# Patient Record
Sex: MALE | Race: OTHER | NOT HISPANIC OR LATINO | Employment: FULL TIME | URBAN - METROPOLITAN AREA
[De-identification: names, ages, dates, MRNs, and addresses within clinical notes are randomized per-mention and may not be internally consistent; named-entity substitution may affect disease eponyms.]

---
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
## 2021-05-25 ENCOUNTER — APPOINTMENT (OUTPATIENT)
Dept: RADIOLOGY | Facility: IMAGING CENTER | Age: 21
End: 2021-05-25
Attending: FAMILY MEDICINE

## 2021-05-25 ENCOUNTER — OCCUPATIONAL MEDICINE (OUTPATIENT)
Dept: URGENT CARE | Facility: CLINIC | Age: 21
End: 2021-05-25
Payer: COMMERCIAL

## 2021-05-25 VITALS
TEMPERATURE: 98.1 F | HEIGHT: 73 IN | BODY MASS INDEX: 34.46 KG/M2 | DIASTOLIC BLOOD PRESSURE: 86 MMHG | WEIGHT: 260 LBS | SYSTOLIC BLOOD PRESSURE: 114 MMHG | RESPIRATION RATE: 18 BRPM | HEART RATE: 101 BPM | OXYGEN SATURATION: 95 %

## 2021-05-25 DIAGNOSIS — S93.402A SPRAIN OF LEFT ANKLE, UNSPECIFIED LIGAMENT, INITIAL ENCOUNTER: ICD-10-CM

## 2021-05-25 PROCEDURE — 99203 OFFICE O/P NEW LOW 30 MIN: CPT | Performed by: FAMILY MEDICINE

## 2021-05-25 PROCEDURE — 73610 X-RAY EXAM OF ANKLE: CPT | Mod: TC,FY,LT | Performed by: FAMILY MEDICINE

## 2021-05-25 ASSESSMENT — ENCOUNTER SYMPTOMS: FALLS: 1

## 2021-05-25 NOTE — PROGRESS NOTES
"Subjective:      Philippe Baum is a 21 y.o. 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 Pain to outer ankle and swelling since.      HPI    Review of Systems   Musculoskeletal: Positive for falls and joint pain.   All other systems reviewed and are negative.         Objective:     /86   Pulse (!) 101   Temp 36.7 °C (98.1 °F) (Temporal)   Resp 18   Ht 1.854 m (6' 1\")   Wt 118 kg (260 lb)   SpO2 95%   BMI 34.30 kg/m²      Physical Exam  Vitals and nursing note reviewed.   Constitutional:       General: He is not in acute distress.     Appearance: Normal appearance. He is well-developed.   HENT:      Head: Normocephalic.   Cardiovascular:      Heart sounds: Normal heart sounds. No murmur heard.     Pulmonary:      Effort: Pulmonary effort is normal. No respiratory distress.   Musculoskeletal:        Feet:    Feet:      Comments: Lt ankle: + edema and TTP. Good SROM. No wounds or discoloration   Neurological:      Mental Status: He is alert.      Motor: No abnormal muscle tone.   Psychiatric:         Mood and Affect: Mood normal.         Behavior: Behavior normal.          Assessment/Plan:          1. Sprain of left ankle  DX-ANKLE 3+ VIEWS LEFT     - walker boot and crutches w/ WBAT  - sedentary duty   - RICE/Motrin  - 5 day recheck    "

## 2021-05-25 NOTE — LETTER
[facility]  [address] - [place] [address]  Phone:  [phone] - Fax:  [phone]   [facility] Progress Report and Disability Certification  Date of Service: 5/25/2021   No Show:  No  Date / Time of Next Visit: 5/30/2021   Claim Information   Patient Name: Philippe Baum  Claim Number:     Employer:   [employer] Date of Injury: 5/25/2021     Insurer / TPA: [employer] *** ID / SSN:    Occupation:   Diagnosis: The encounter diagnosis was Sprain of left ankle.    Medical Information   Related to Industrial Injury? 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 Pain to outer ankle and swelling since.    Objective Findings:     Pre-Existing Condition(s):     Assessment:   Initial Visit    Status: Additional Care Required  Permanent Disability:No    Plan:      Diagnostics: X-ray    Comments:       Disability Information   Status: Released to Restricted Duty    From:  5/25/2021  Through: 5/30/2021 Restrictions are: Temporary   Physical Restrictions   Sitting:    Standing:    Stooping:    Bending:      Squatting:    Walking:    Climbing:    Pushing:      Pulling:    Other:    Reaching Above Shoulder (L):   Reaching Above Shoulder (R):       Reaching Below Shoulder (L):    Reaching Below Shoulder (R):      Not to exceed Weight Limits   Carrying(hrs):   Weight Limit(lb):   Lifting(hrs):   Weight  Limit(lb):     Comments: Sedentary duty     Repetitive Actions   Hands: i.e. Fine Manipulations from Grasping:     Feet: i.e. Operating Foot Controls:     Driving / Operate Machinery:     Provider Name:   Richard Neely M.D. Physician Signature:  Physician Name:     Clinic Name / Location: [facility] [place]  [address]  [place] [address] Clinic Phone Number: Dept: [phone]      Appointment Time: 4:00 Pm Visit Start Time: 4:34 PM   Check-In Time:  4:14 Pm Visit Discharge Time:  5:30 PM   Original-Treating Physician or Chiropractor    Page 2-Insurer/TPA    Page 3-Employer    Page 4-Employee

## 2021-05-25 NOTE — LETTER
"EMPLOYEE’S CLAIM FOR COMPENSATION/ REPORT OF INITIAL TREATMENT  FORM C-4    EMPLOYEE’S CLAIM - PROVIDE ALL INFORMATION REQUESTED   First Name  Philippe Last Name  Bautista Birthdate                    2000                Sex  male Claim Number   Home Address  [address] Age  21 y.o. Height  1.854 m (6' 1\") Weight  118 kg (260 lb) [facility]     [facility] Zip  [address] Telephone  [phone] (home)    Mailing Address  [address] [facility] Zip  [address] Primary Language Spoken  English    Insurer  [employer] Third Party   [employer]   Employee's Occupation (Job Title) When Injury or Occupational Disease Occurred      Employer's Name    [employer] Telephone   [phone]   Employer Address  [address]  Zip  [address]   Date of Injury  5/25/2021               Hour of Injury  3:16 PM Date Employer Notified  5/25/2021 Last Day of Work after Injury     or Occupational Disease  5/25/2021 Supervisor to Whom Injury     Reported  GARCES MARY   Address or Location of Accident (if applicable)  Work [1]   What were you doing at the time of accident? (if applicable)  Loading tools into van    How did this injury or occupational disease occur? (Be specific an answer in detail. Use additional sheet if necessary)  I walked down a hill to get more tools and tripped and fell  my weight on my  left ankle.  Then heard a pop.   If you believe that you have an occupational disease, when did you first have knowledge of the disability and it relationship to your employment?   Witnesses to the Accident        Nature of Injury or Occupational Disease  Sprain  Part(s) of Body Injured or Affected  Ankle (L), N/A, N/A    I certify that the above is true and correct to the best of my knowledge and that I have provided " this information in order to obtain the benefits of [place]  I hereby authorize any physician, chiropractor, surgeon, practitioner, or other person, any hospital, including [facility] or [facility] hospital, any medical service organization, any insurance company, or other institution or organization to release to each other, any medical or other information, including benefits paid or payable, pertinent to this injury or disease, except information relative to diagnosis, treatment and/or counseling for AIDS, psychological conditions, alcohol or controlled substances, for which I must give specific authorization.  A Photostat of this authorization shall be as valid as the original.     Date 5/25/21   [facility]   Employee’s Signature   THIS REPORT MUST BE COMPLETED AND MAILED WITHIN 3 WORKING DAYS OF TREATMENT   Place  [facility]  Name of Facility  [facility]   Date  5/25/2021 Diagnosis  (S93.402A) Sprain of left ankle Is there evidence the injured employee was under the              influence of alcohol and/or another controlled substance at the time of accident?   Hour  4:34 PM Description of Injury or Disease  The encounter diagnosis was Sprain of left ankle. No   Treatment  Walker boot, crutches, work restrictions, OTC Motrin. X-Ray  Have you advised the patient to remain off work five days or     more? No   X-Ray Findings  Negative   If Yes   From Date  To Date      From information given by the employee, together with medical evidence, can you directly connect this injury or occupational disease as job incurred?  Yes If No Full Duty    No Modified Duty  Yes   Is additional medical care by a physician indicated?  Yes If Modified Duty, Specify any Limitations / Restrictions  Sedentary duty    Do you know of any previous injury or disease contributing to this condition or  "occupational disease?                            No   Date  5/25/2021 Print Doctor’s Name   Richard Neely M.D. I certify the employer’s copy of  this form was mailed on:   Address  [address] Insurer’s Use Only     [facility] Zip  [address]    Provider’s Tax ID Number  [id number] Telephone  Dept: [phone]      e-RICHARD Swenson M.D.  Signature:     Degree          ORIGINAL-TREATING PHYSICIAN OR CHIROPRACTOR    PAGE 2-INSURER/TPA    PAGE 3-EMPLOYER    PAGE 4-EMPLOYEE        Form C-4 (rev.10/07)           BRIEF DESCRIPTION OF RIGHTS AND BENEFITS  (Pursuant to [place] 616[place])    Notice of Injury or Occupational Disease (Incident Report Form C-1): If an injury or occupational disease (OD) arises out of and in the course of employment, you must provide written notice to your employer as soon as practicable, but no later than 7 days after the accident or OD. Your employer shall maintain a sufficient supply of the required forms.    Claim for Compensation (Form C-4): If medical treatment is sought, the form C-4 is available at the place of initial treatment. A completed \"Claim for Compensation\" (Form C-4) must be filed within 90 days after an accident or OD. The treating physician or chiropractor must, within 3 working days after treatment, complete and mail to the employer, the employer's insurer and third-party , the Claim for Compensation.    Medical Treatment: If you require medical treatment for your on-the-job injury or OD, you may be required to select a physician or chiropractor from a list provided by your workers’ compensation insurer, if it has contracted with an Organization for Managed Care (MCO) or Preferred Provider Organization (PPO) or providers of health care. If your employer has not entered into a contract with an MCO or PPO, you may select a physician or chiropractor from the Panel of Physicians and Chiropractors. Any medical costs related to " your industrial injury or OD will be paid by your insurer.    Temporary Total Disability (TTD): If your doctor has certified that you are unable to work for a period of at least 5 consecutive days, or 5 cumulative days in a 20-day period, or places restrictions on you that your employer does not accommodate, you may be entitled to TTD compensation.    Temporary Partial Disability (TPD): If the wage you receive upon reemployment is less than the compensation for TTD to which you are entitled, the insurer may be required to pay you TPD compensation to make up the difference. TPD can only be paid for a maximum of 24 months.    Permanent Partial Disability (PPD): When your medical condition is stable and there is an indication of a PPD as a result of your injury or OD, within 30 days, your insurer must arrange for an evaluation by a rating physician or chiropractor to determine the degree of your PPD. The amount of your PPD award depends on the date of injury, the results of the PPD evaluation, your age and wage.    Permanent Total Disability (PTD): If you are medically certified by a treating physician or chiropractor as permanently and totally disabled and have been granted a PTD status by your insurer, you are entitled to receive monthly benefits not to exceed 66 2/3% of your average monthly wage. The amount of your PTD payments is subject to reduction if you previously received a lump-sum PPD award.    Vocational Rehabilitation Services: You may be eligible for vocational rehabilitation services if you are unable to return to the job due to a permanent physical impairment or permanent restrictions as a result of your injury or occupational disease.    Transportation and Per Shanita Reimbursement: You may be eligible for travel expenses and per shanita associated with medical treatment.    Reopening: You may be able to reopen your claim if your condition worsens after claim closure.     Appeal Process: If you disagree with  a written determination issued by the insurer or the insurer does not respond to your request, you may appeal to the Department of Administration, , by following the instructions contained in your determination letter. You must appeal the determination within 70 days from the date of the determination letter at [address], [address], [place], [place] [address], or [address], [address], [place], [place] [address]. If you disagree with the  decision, you may appeal to the Department of Administration, . You must file your appeal within 30 days from the date of the  decision letter at [address], [address], [place], [place] [address], or [address], [address], [place], [place] [address]. If you disagree with a decision of an , you may file a petition for judicial review with the District Court. You must do so within 30 days of the Appeal Officer’s decision. You may be represented by an  at your own expense or you may contact the [facility] for possible representation.    [place]  for Injured Workers (NAIW): If you disagree with a  decision, you may request that NAIW represent you without charge at an  Hearing. For information regarding denial of benefits, you may contact the [facility] at: [address], [address], [place] [address], [phone], or [address], [address], [place] [address], [phone]    To File a Complaint with the Division: If you wish to file a complaint with the  of the Division of Industrial Relations (DIR),  please contact the Workers’ Compensation Section, [address], [address], [place], [place] [address], telephone [phone]) [phone], or [address], [address], [place], [place] [address], telephone [phone].    For assistance with Workers’ Compensation Issues: You may contact the [facility]  [place] Office for Consumer Health Assistance, [address] [place] [address], [facility] [address], [place] [address], Toll Free 1-684.747.1439, Web site: http://[facility].[place].gov/Programs/CHEVY E-mail: chevy@[facility].nv.[facility]              __________________________________________________________________                                    ______5/25/21___________            Employee Name / Signature                                                                                                                            Date                                                                                                                                                                                                                              D-2 (rev. 10/20)

## 2021-05-30 ENCOUNTER — OCCUPATIONAL MEDICINE (OUTPATIENT)
Dept: URGENT CARE | Facility: CLINIC | Age: 21
End: 2021-05-30
Payer: COMMERCIAL

## 2021-05-30 VITALS
BODY MASS INDEX: 35.16 KG/M2 | OXYGEN SATURATION: 99 % | DIASTOLIC BLOOD PRESSURE: 50 MMHG | RESPIRATION RATE: 18 BRPM | HEART RATE: 92 BPM | SYSTOLIC BLOOD PRESSURE: 98 MMHG | WEIGHT: 274 LBS | TEMPERATURE: 98.5 F | HEIGHT: 74 IN

## 2021-05-30 DIAGNOSIS — S93.402D SPRAIN OF LEFT ANKLE, UNSPECIFIED LIGAMENT, SUBSEQUENT ENCOUNTER: ICD-10-CM

## 2021-05-30 PROCEDURE — 99213 OFFICE O/P EST LOW 20 MIN: CPT | Performed by: PHYSICIAN ASSISTANT

## 2021-05-30 NOTE — LETTER
[facility] [address] - [place] [address]  Phone:  [phone] - Fax:  [phone]   [facility] Progress Report and Disability Certification  Date of Service: 5/30/2021   No Show:  No  Date / Time of Next Visit:     Claim Information   Patient Name: Philippe Baum  Claim Number:     Employer:  [employer] Date of Injury: 5/25/2021     Insurer / TPA: [facility] *** ID / SSN:     Occupation:   Diagnosis: The encounter diagnosis was Sprain of left ankle, unspecified ligament, subsequent encounter.    Medical Information   Related to Industrial Injury? Yes    Subjective Complaints:  DOI 5/25/2021   Visit #2.  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 Previous x ray normal.  Pt states he is feeling better and ready for full duty.   Objective Findings: Constitutional: Pt is oriented to person, place, and time.  Appears well-developed and well-nourished. No distress.   HENT:   Mouth/Throat: Oropharynx is clear and moist.   Eyes: Conjunctivae are normal.   Cardiovascular: Normal rate.    Pulmonary/Chest: Effort normal.   Musculoskeletal: Mild swelling and full ROM of left ankle.  Neurological: Pt is alert and oriented to person, place, and time. Coordination normal.   Skin: Skin is warm. Pt is not diaphoretic. No erythema.   Psychiatric: Pt has a normal mood and affect.  Behavior is normal.      Pre-Existing Condition(s):     Assessment:   Initial Visit    Status: Discharged /  MMI  Permanent Disability:No    Plan:      Diagnostics:      Comments:       Disability Information   Status: Released to Full Duty    From:     Through:   Restrictions are:     Physical Restrictions   Sitting:    Standing:    Stooping:    Bending:      Squatting:    Walking:    Climbing:    Pushing:      Pulling:    Other:    Reaching Above Shoulder (L):   Reaching Above Shoulder  (R):       Reaching Below Shoulder (L):    Reaching Below Shoulder (R):      Not to exceed Weight Limits   Carrying(hrs):   Weight Limit(lb):   Lifting(hrs):   Weight  Limit(lb):     Comments:      Repetitive Actions   Hands: i.e. Fine Manipulations from Grasping:     Feet: i.e. Operating Foot Controls:     Driving / Operate Machinery:     Provider Name:   Estevan Vo P.A.-C. Physician Signature:  Physician Name:     Clinic Name / Location: [address]  [place] [address] Clinic Phone Number: Dept: [phone]   Appointment Time: 1:00 Pm Visit Start Time: 12:47 PM   Check-In Time:  12:27 Pm Visit Discharge Time:  1:03 PM   Original-Treating Physician or Chiropractor    Page 2-Insurer/TPA    Page 3-Employer    Page 4-Employee

## 2021-05-30 NOTE — PROGRESS NOTES
"Subjective:     Philippe Baum is a 21 y.o. male who presents for Follow-Up (W/C sprain lt ankle 5-25-21)      DOI 5/25/2021   Visit #2.  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 Previous x ray normal.  Pt states he is feeling better and ready for full duty.    PMH:   No pertinent past medical history to this problem  MEDS:  Medications were reviewed in EMR  ALLERGIES:  Allergies were reviewed in EMR  SOCHX:    FH:   No pertinent family history to this problem       Objective:     Blood Pressure (Abnormal) 98/50 (BP Location: Right arm, Patient Position: Sitting)   Pulse 92   Temperature 36.9 °C (98.5 °F) (Tympanic)   Respiration 18   Height 1.88 m (6' 2\")   Weight 124 kg (274 lb)   Oxygen Saturation 99%   Body Mass Index 35.18 kg/m²     Constitutional: Pt is oriented to person, place, and time.  Appears well-developed and well-nourished. No distress.   HENT:   Mouth/Throat: Oropharynx is clear and moist.   Eyes: Conjunctivae are normal.   Cardiovascular: Normal rate.    Pulmonary/Chest: Effort normal.   Musculoskeletal: Mild swelling and full ROM of left ankle.  Neurological: Pt is alert and oriented to person, place, and time. Coordination normal.   Skin: Skin is warm. Pt is not diaphoretic. No erythema.   Psychiatric: Pt has a normal mood and affect.  Behavior is normal.       Assessment/Plan:       1. Sprain of left ankle, unspecified ligament, subsequent encounter    • Released to Full Duty FROM   TO    •  Discharged  •      Differential diagnosis, natural history, supportive care, and indications for immediate follow-up discussed.  "

## 2024-05-07 NOTE — ED NOTES
"Final cultures resulted:    Results     Procedure Component Value Units Date/Time    BLOOD CULTURE [[id number]]  (Abnormal) Collected:  06/05/17 0640    Order Status:  Completed Specimen Information:  Blood from Peripheral Updated:  06/12/17 1501     Significant Indicator POS (POS)      Source BLD      Site PERIPHERAL      Blood Culture        Result:        Growth detected by Bactec instrument.  06/08/2017  01:46 (A)     Blood Culture Peptostreptococcus micros (A)     Narrative:      Per Hospital Policy: Only change Specimen Src: to \"Line\" if  specified by physician order.    BLOOD CULTURE [[id number]] Collected:  06/05/17 0635    Order Status:  Completed Specimen Information:  Blood from Peripheral Updated:  06/10/17 0900     Significant Indicator NEG      Source BLD      Site PERIPHERAL      Blood Culture No growth after 5 days of incubation.     Narrative:      Per Hospital Policy: Only change Specimen Src: to \"Line\" if  specified by physician order.        Previous attempts to contact the patient by phone have not been successful. Will send the patient a letter to notify of result and need for further evaluation if not improved.     Mary Beth Schneider, PharmD, CGP, BCPS      " Spoke with patient and confirmed Lexiscan stress test appointment on May 9, 2024 at 0830. Instructions for test,current medication list, and COVID-19 preprocedure information reviewed with patient, questions answered. Patient verbalized understanding.